# Patient Record
Sex: MALE | Race: WHITE | NOT HISPANIC OR LATINO
[De-identification: names, ages, dates, MRNs, and addresses within clinical notes are randomized per-mention and may not be internally consistent; named-entity substitution may affect disease eponyms.]

---

## 2017-02-08 ENCOUNTER — CHART COPY (OUTPATIENT)
Age: 72
End: 2017-02-08

## 2019-11-26 ENCOUNTER — APPOINTMENT (OUTPATIENT)
Dept: GASTROENTEROLOGY | Facility: CLINIC | Age: 74
End: 2019-11-26
Payer: MEDICARE

## 2019-11-26 VITALS
WEIGHT: 190 LBS | DIASTOLIC BLOOD PRESSURE: 70 MMHG | TEMPERATURE: 97.4 F | HEART RATE: 57 BPM | BODY MASS INDEX: 26.6 KG/M2 | SYSTOLIC BLOOD PRESSURE: 120 MMHG | OXYGEN SATURATION: 98 % | HEIGHT: 71 IN

## 2019-11-26 DIAGNOSIS — Z82.49 FAMILY HISTORY OF ISCHEMIC HEART DISEASE AND OTHER DISEASES OF THE CIRCULATORY SYSTEM: ICD-10-CM

## 2019-11-26 DIAGNOSIS — N18.9 CHRONIC KIDNEY DISEASE, UNSPECIFIED: ICD-10-CM

## 2019-11-26 DIAGNOSIS — Z78.9 OTHER SPECIFIED HEALTH STATUS: ICD-10-CM

## 2019-11-26 DIAGNOSIS — K63.5 POLYP OF COLON: ICD-10-CM

## 2019-11-26 DIAGNOSIS — Z12.11 ENCOUNTER FOR SCREENING FOR MALIGNANT NEOPLASM OF COLON: ICD-10-CM

## 2019-11-26 DIAGNOSIS — I25.10 ATHEROSCLEROTIC HEART DISEASE OF NATIVE CORONARY ARTERY W/OUT ANGINA PECTORIS: ICD-10-CM

## 2019-11-26 DIAGNOSIS — K86.2 CYST OF PANCREAS: ICD-10-CM

## 2019-11-26 DIAGNOSIS — Z85.79 PERSONAL HISTORY OF OTHER MALIGNANT NEOPLASMS OF LYMPHOID, HEMATOPOIETIC AND RELATED TISSUES: ICD-10-CM

## 2019-11-26 DIAGNOSIS — C61 MALIGNANT NEOPLASM OF PROSTATE: ICD-10-CM

## 2019-11-26 DIAGNOSIS — Z95.5 PRESENCE OF CORONARY ANGIOPLASTY IMPLANT AND GRAFT: ICD-10-CM

## 2019-11-26 PROCEDURE — 99204 OFFICE O/P NEW MOD 45 MIN: CPT

## 2019-11-26 RX ORDER — NIACIN 500 MG/1
500 TABLET, FILM COATED, EXTENDED RELEASE ORAL
Qty: 90 | Refills: 0 | Status: ACTIVE | COMMUNITY
Start: 2019-08-06

## 2019-11-26 RX ORDER — ASPIRIN 81 MG
81 TABLET, DELAYED RELEASE (ENTERIC COATED) ORAL
Refills: 0 | Status: ACTIVE | COMMUNITY

## 2019-11-26 RX ORDER — FOLIC ACID 20 MG
CAPSULE ORAL
Refills: 0 | Status: ACTIVE | COMMUNITY

## 2019-11-26 RX ORDER — METOPROLOL SUCCINATE 25 MG/1
25 TABLET, EXTENDED RELEASE ORAL
Qty: 135 | Refills: 0 | Status: ACTIVE | COMMUNITY
Start: 2019-08-06

## 2019-11-26 NOTE — PHYSICAL EXAM
[General Appearance - In No Acute Distress] : in no acute distress [General Appearance - Alert] : alert [Sclera] : the sclera and conjunctiva were normal [PERRL With Normal Accommodation] : pupils were equal in size, round, and reactive to light [Outer Ear] : the ears and nose were normal in appearance [Extraocular Movements] : extraocular movements were intact [Oropharynx] : the oropharynx was normal [Neck Appearance] : the appearance of the neck was normal [Thyroid Diffuse Enlargement] : the thyroid was not enlarged [Neck Cervical Mass (___cm)] : no neck mass was observed [Jugular Venous Distention Increased] : there was no jugular-venous distention [Thyroid Nodule] : there were no palpable thyroid nodules [Heart Rate And Rhythm] : heart rate was normal and rhythm regular [Auscultation Breath Sounds / Voice Sounds] : lungs were clear to auscultation bilaterally [Heart Sounds] : normal S1 and S2 [Heart Sounds Gallop] : no gallops [Heart Sounds Pericardial Friction Rub] : no pericardial rub [Murmurs] : no murmurs [Bowel Sounds] : normal bowel sounds [Abdomen Soft] : soft [Abdomen Tenderness] : non-tender [] : no hepato-splenomegaly [Cervical Lymph Nodes Enlarged Posterior Bilaterally] : posterior cervical [Abdomen Mass (___ Cm)] : no abdominal mass palpated [Supraclavicular Lymph Nodes Enlarged Bilaterally] : supraclavicular [Cervical Lymph Nodes Enlarged Anterior Bilaterally] : anterior cervical [No CVA Tenderness] : no ~M costovertebral angle tenderness [No Spinal Tenderness] : no spinal tenderness [Nail Clubbing] : no clubbing  or cyanosis of the fingernails [Abnormal Walk] : normal gait [Motor Tone] : muscle strength and tone were normal [Musculoskeletal - Swelling] : no joint swelling seen [Sensation] : the sensory exam was normal to light touch and pinprick [Deep Tendon Reflexes (DTR)] : deep tendon reflexes were 2+ and symmetric [Oriented To Time, Place, And Person] : oriented to person, place, and time [No Focal Deficits] : no focal deficits [Impaired Insight] : insight and judgment were intact [Affect] : the affect was normal

## 2019-11-26 NOTE — HISTORY OF PRESENT ILLNESS
[de-identified] : 74 year old man presents for a screening colonoscopy. his last colonoscopy was over 5 years ago and he had some polyps removed. He denies rectal bleeding , melena or hematemesis. He is being treated for multiple myeloma. He has a history of CAD and is s/p CABG and stent placement.

## 2020-02-19 ENCOUNTER — APPOINTMENT (OUTPATIENT)
Dept: GASTROENTEROLOGY | Facility: AMBULATORY MEDICAL SERVICES | Age: 75
End: 2020-02-19

## 2020-02-26 ENCOUNTER — APPOINTMENT (OUTPATIENT)
Dept: GASTROENTEROLOGY | Facility: AMBULATORY MEDICAL SERVICES | Age: 75
End: 2020-02-26

## 2020-06-07 ENCOUNTER — APPOINTMENT (OUTPATIENT)
Dept: DISASTER EMERGENCY | Facility: CLINIC | Age: 75
End: 2020-06-07

## 2020-06-07 DIAGNOSIS — Z01.818 ENCOUNTER FOR OTHER PREPROCEDURAL EXAMINATION: ICD-10-CM

## 2020-06-07 LAB — SARS-COV-2 N GENE NPH QL NAA+PROBE: NOT DETECTED

## 2020-06-08 ENCOUNTER — TRANSCRIPTION ENCOUNTER (OUTPATIENT)
Age: 75
End: 2020-06-08

## 2020-06-10 ENCOUNTER — APPOINTMENT (OUTPATIENT)
Dept: GASTROENTEROLOGY | Facility: AMBULATORY MEDICAL SERVICES | Age: 75
End: 2020-06-10
Payer: MEDICARE

## 2020-06-10 PROCEDURE — 45380 COLONOSCOPY AND BIOPSY: CPT

## 2020-08-10 ENCOUNTER — APPOINTMENT (OUTPATIENT)
Dept: OTOLARYNGOLOGY | Facility: CLINIC | Age: 75
End: 2020-08-10
Payer: MEDICARE

## 2020-08-10 VITALS
WEIGHT: 195 LBS | TEMPERATURE: 97.9 F | DIASTOLIC BLOOD PRESSURE: 72 MMHG | HEART RATE: 58 BPM | BODY MASS INDEX: 27.3 KG/M2 | SYSTOLIC BLOOD PRESSURE: 117 MMHG | HEIGHT: 71 IN

## 2020-08-10 DIAGNOSIS — H93.13 TINNITUS, BILATERAL: ICD-10-CM

## 2020-08-10 DIAGNOSIS — H90.3 SENSORINEURAL HEARING LOSS, BILATERAL: ICD-10-CM

## 2020-08-10 PROCEDURE — 92567 TYMPANOMETRY: CPT

## 2020-08-10 PROCEDURE — 99204 OFFICE O/P NEW MOD 45 MIN: CPT | Mod: 25

## 2020-08-10 PROCEDURE — 92557 COMPREHENSIVE HEARING TEST: CPT

## 2020-08-10 NOTE — ASSESSMENT
[FreeTextEntry1] : Bilateral Tinnitus w/ Assoc mild to moderate SNHL\par Rec Acupuncture andf avoid l;oud noises\par \par \par -Hearing Test performed to evaluate the extent of hearing loss and to explain pt's symptoms-SNHL\par bilateral sensorineural hearing loss-cleared for hearing aids\par \par f/u 6 months and prn

## 2020-08-10 NOTE — HISTORY OF PRESENT ILLNESS
[Tinnitus] : tinnitus [No] : patient does not have a  history of radiation therapy [Hearing Loss] : hearing loss [Presbycusis] : presbycusis [de-identified] : 76 yo male\par Patient complains of bilateral tinnitus x 1 month. Describes as a constant humming. Has a mild hearing loss. tinnitus does not effect hearing. Denies recent loud noise exposure, URI. \par Pt has no ear pain, ear drainage, vertigo, nasal congestion, nasal discharge, epistaxis, sinus infections, facial pain, facial pressure, throat pain, dysphagia or fevers\par \par  [Anxiety] : no anxiety [Dizziness] : no dizziness [Recurrent Otitis Media] : no recurrent otitis media [Headache] : no headache [Otitis Media with Effusion] : no otitis media with effusion [Congenital Ear Malformation] : no congenital ear malformation [Meniere Disease] : no Meniere disease [Otosclerosis] : no otosclerosis [Eustachian Tube Dysfunction] : no eustachian tube dysfunction [Perilymphatic Fistula] : no perilymphatic fistula [Hypertension] : no hypertension [Smoking] : no smoking [Loud Noise Exposure] : no history of loud noise exposure [Stroke] : no stroke [Early Onset Hearing Loss] : no early onset hearing loss [Facial Pressure] : no facial pressure [Facial Pain] : no facial pain [Nasal Congestion] : no nasal congestion [Diplopia] : no diplopia [Allergic Rhinitis] : no allergic rhinitis [Ear Fullness] : no ear fullness [Maxillary Tooth Infection] : no maxillary tooth infection [Environmental Allergies] : no environmental allergies [Septal Deviation] : no septal deviation [Adenoidectomy] : no adenoidectomy [Environmental Allergens] : no environmental allergens [Seasonal Allergies] : no seasonal allergies [Nasal FB Removal] : no nasal foreign body removal [Allergies] : no allergies [Asthma] : no asthma [Neck Mass] : no neck mass [Neck Pain] : no neck pain [Chills] : no chills [Cough] : no cough [Cold Intolerance] : no cold intolerance [Fatigue] : no fatigue [Hyperthyroidism] : no hyperthyroidism [Heat Intolerance] : no heat intolerance [Sialadenitis] : no sialadenitis [Hodgkin Disease] : no hodgkin disease [Alcohol Use] : no alcohol use [Non-Hodgkin Lymphoma] : no non-hodgkin lymphoma [Tobacco Use] : no tobacco use [Graves Disease] : no graves disease [Thyroid Cancer] : no thyroid cancer

## 2020-08-10 NOTE — DATA REVIEWED
[de-identified] : bilat SNHL\par Hearing Test performed to evaluate the extent of hearing loss and  to explain pt's symptoms\par

## 2020-12-21 PROBLEM — Z12.11 ENCOUNTER FOR SCREENING COLONOSCOPY: Status: RESOLVED | Noted: 2019-11-26 | Resolved: 2020-12-21

## 2022-01-24 ENCOUNTER — INPATIENT (INPATIENT)
Facility: HOSPITAL | Age: 77
LOS: 1 days | Discharge: HOME | DRG: 69 | End: 2022-01-26
Attending: FAMILY MEDICINE | Admitting: FAMILY MEDICINE
Payer: MEDICARE

## 2022-01-24 VITALS
HEIGHT: 71.5 IN | RESPIRATION RATE: 18 BRPM | TEMPERATURE: 97 F | SYSTOLIC BLOOD PRESSURE: 142 MMHG | WEIGHT: 192.02 LBS | HEART RATE: 63 BPM | DIASTOLIC BLOOD PRESSURE: 77 MMHG | OXYGEN SATURATION: 100 %

## 2022-01-24 DIAGNOSIS — R42 DIZZINESS AND GIDDINESS: ICD-10-CM

## 2022-01-24 DIAGNOSIS — Z29.9 ENCOUNTER FOR PROPHYLACTIC MEASURES, UNSPECIFIED: ICD-10-CM

## 2022-01-24 LAB
ALBUMIN SERPL ELPH-MCNC: 3.6 G/DL — SIGNIFICANT CHANGE UP (ref 3.3–5)
ALP SERPL-CCNC: 59 U/L — SIGNIFICANT CHANGE UP (ref 40–120)
ALT FLD-CCNC: 23 U/L — SIGNIFICANT CHANGE UP (ref 12–78)
ANION GAP SERPL CALC-SCNC: 6 MMOL/L — SIGNIFICANT CHANGE UP (ref 5–17)
APTT BLD: 30.9 SEC — SIGNIFICANT CHANGE UP (ref 27.5–35.5)
AST SERPL-CCNC: 18 U/L — SIGNIFICANT CHANGE UP (ref 15–37)
BASOPHILS # BLD AUTO: 0.03 K/UL — SIGNIFICANT CHANGE UP (ref 0–0.2)
BASOPHILS NFR BLD AUTO: 0.5 % — SIGNIFICANT CHANGE UP (ref 0–2)
BILIRUB SERPL-MCNC: 1 MG/DL — SIGNIFICANT CHANGE UP (ref 0.2–1.2)
BUN SERPL-MCNC: 24 MG/DL — HIGH (ref 7–23)
CALCIUM SERPL-MCNC: 8.8 MG/DL — SIGNIFICANT CHANGE UP (ref 8.5–10.1)
CHLORIDE SERPL-SCNC: 107 MMOL/L — SIGNIFICANT CHANGE UP (ref 96–108)
CK MB BLD-MCNC: <1.2 % — SIGNIFICANT CHANGE UP (ref 0–3.5)
CK MB CFR SERPL CALC: <1 NG/ML — SIGNIFICANT CHANGE UP (ref 0–3.6)
CK SERPL-CCNC: 82 U/L — SIGNIFICANT CHANGE UP (ref 26–308)
CO2 SERPL-SCNC: 26 MMOL/L — SIGNIFICANT CHANGE UP (ref 22–31)
CREAT SERPL-MCNC: 1.6 MG/DL — HIGH (ref 0.5–1.3)
EOSINOPHIL # BLD AUTO: 0.16 K/UL — SIGNIFICANT CHANGE UP (ref 0–0.5)
EOSINOPHIL NFR BLD AUTO: 2.7 % — SIGNIFICANT CHANGE UP (ref 0–6)
GLUCOSE SERPL-MCNC: 147 MG/DL — HIGH (ref 70–99)
HCT VFR BLD CALC: 44.3 % — SIGNIFICANT CHANGE UP (ref 39–50)
HGB BLD-MCNC: 15.4 G/DL — SIGNIFICANT CHANGE UP (ref 13–17)
IMM GRANULOCYTES NFR BLD AUTO: 0.2 % — SIGNIFICANT CHANGE UP (ref 0–1.5)
INR BLD: 1.08 RATIO — SIGNIFICANT CHANGE UP (ref 0.88–1.16)
LYMPHOCYTES # BLD AUTO: 0.99 K/UL — LOW (ref 1–3.3)
LYMPHOCYTES # BLD AUTO: 17 % — SIGNIFICANT CHANGE UP (ref 13–44)
MCHC RBC-ENTMCNC: 32.2 PG — SIGNIFICANT CHANGE UP (ref 27–34)
MCHC RBC-ENTMCNC: 34.8 GM/DL — SIGNIFICANT CHANGE UP (ref 32–36)
MCV RBC AUTO: 92.7 FL — SIGNIFICANT CHANGE UP (ref 80–100)
MONOCYTES # BLD AUTO: 0.6 K/UL — SIGNIFICANT CHANGE UP (ref 0–0.9)
MONOCYTES NFR BLD AUTO: 10.3 % — SIGNIFICANT CHANGE UP (ref 2–14)
NEUTROPHILS # BLD AUTO: 4.04 K/UL — SIGNIFICANT CHANGE UP (ref 1.8–7.4)
NEUTROPHILS NFR BLD AUTO: 69.3 % — SIGNIFICANT CHANGE UP (ref 43–77)
NRBC # BLD: 0 /100 WBCS — SIGNIFICANT CHANGE UP (ref 0–0)
PLAT MORPH BLD: NORMAL — SIGNIFICANT CHANGE UP
PLATELET # BLD AUTO: 127 K/UL — LOW (ref 150–400)
POTASSIUM SERPL-MCNC: 3.7 MMOL/L — SIGNIFICANT CHANGE UP (ref 3.5–5.3)
POTASSIUM SERPL-SCNC: 3.7 MMOL/L — SIGNIFICANT CHANGE UP (ref 3.5–5.3)
PROT SERPL-MCNC: 7.3 G/DL — SIGNIFICANT CHANGE UP (ref 6–8.3)
PROTHROM AB SERPL-ACNC: 12.6 SEC — SIGNIFICANT CHANGE UP (ref 10.6–13.6)
RBC # BLD: 4.78 M/UL — SIGNIFICANT CHANGE UP (ref 4.2–5.8)
RBC # FLD: 13.7 % — SIGNIFICANT CHANGE UP (ref 10.3–14.5)
RBC BLD AUTO: NORMAL — SIGNIFICANT CHANGE UP
SARS-COV-2 RNA SPEC QL NAA+PROBE: SIGNIFICANT CHANGE UP
SODIUM SERPL-SCNC: 139 MMOL/L — SIGNIFICANT CHANGE UP (ref 135–145)
TROPONIN I, HIGH SENSITIVITY RESULT: 8.9 NG/L — SIGNIFICANT CHANGE UP
WBC # BLD: 5.83 K/UL — SIGNIFICANT CHANGE UP (ref 3.8–10.5)
WBC # FLD AUTO: 5.83 K/UL — SIGNIFICANT CHANGE UP (ref 3.8–10.5)

## 2022-01-24 PROCEDURE — 70450 CT HEAD/BRAIN W/O DYE: CPT | Mod: 26,MA,59

## 2022-01-24 PROCEDURE — 70496 CT ANGIOGRAPHY HEAD: CPT | Mod: 26,MA

## 2022-01-24 PROCEDURE — 99285 EMERGENCY DEPT VISIT HI MDM: CPT

## 2022-01-24 PROCEDURE — 93010 ELECTROCARDIOGRAM REPORT: CPT

## 2022-01-24 PROCEDURE — 70498 CT ANGIOGRAPHY NECK: CPT | Mod: 26,MA

## 2022-01-24 PROCEDURE — 0042T: CPT

## 2022-01-24 PROCEDURE — 71045 X-RAY EXAM CHEST 1 VIEW: CPT | Mod: 26

## 2022-01-24 RX ORDER — ROSUVASTATIN CALCIUM 5 MG/1
1 TABLET ORAL
Qty: 0 | Refills: 0 | DISCHARGE

## 2022-01-24 RX ORDER — ATORVASTATIN CALCIUM 80 MG/1
40 TABLET, FILM COATED ORAL AT BEDTIME
Refills: 0 | Status: DISCONTINUED | OUTPATIENT
Start: 2022-01-24 | End: 2022-01-26

## 2022-01-24 RX ORDER — METOPROLOL TARTRATE 50 MG
25 TABLET ORAL DAILY
Refills: 0 | Status: DISCONTINUED | OUTPATIENT
Start: 2022-01-24 | End: 2022-01-26

## 2022-01-24 RX ORDER — CHOLECALCIFEROL (VITAMIN D3) 125 MCG
1 CAPSULE ORAL
Qty: 0 | Refills: 0 | DISCHARGE

## 2022-01-24 RX ORDER — OMEGA-3 ACID ETHYL ESTERS 1 G
2 CAPSULE ORAL
Qty: 0 | Refills: 0 | DISCHARGE

## 2022-01-24 RX ORDER — METOPROLOL TARTRATE 50 MG
1 TABLET ORAL
Qty: 0 | Refills: 0 | DISCHARGE

## 2022-01-24 RX ORDER — FOLIC ACID 0.8 MG
1 TABLET ORAL
Qty: 0 | Refills: 0 | DISCHARGE

## 2022-01-24 RX ORDER — MECLIZINE HCL 12.5 MG
12.5 TABLET ORAL THREE TIMES A DAY
Refills: 0 | Status: DISCONTINUED | OUTPATIENT
Start: 2022-01-24 | End: 2022-01-26

## 2022-01-24 RX ORDER — FOLIC ACID 0.8 MG
1 TABLET ORAL DAILY
Refills: 0 | Status: DISCONTINUED | OUTPATIENT
Start: 2022-01-24 | End: 2022-01-26

## 2022-01-24 RX ORDER — ASPIRIN/CALCIUM CARB/MAGNESIUM 324 MG
325 TABLET ORAL DAILY
Refills: 0 | Status: DISCONTINUED | OUTPATIENT
Start: 2022-01-24 | End: 2022-01-26

## 2022-01-24 RX ORDER — NIACIN 50 MG
0 TABLET ORAL
Qty: 0 | Refills: 0 | DISCHARGE

## 2022-01-24 RX ORDER — NIACIN 50 MG
500 TABLET ORAL AT BEDTIME
Refills: 0 | Status: DISCONTINUED | OUTPATIENT
Start: 2022-01-24 | End: 2022-01-26

## 2022-01-24 RX ORDER — AMLODIPINE BESYLATE 2.5 MG/1
2.5 TABLET ORAL DAILY
Refills: 0 | Status: DISCONTINUED | OUTPATIENT
Start: 2022-01-24 | End: 2022-01-25

## 2022-01-24 RX ORDER — CHOLECALCIFEROL (VITAMIN D3) 125 MCG
1000 CAPSULE ORAL DAILY
Refills: 0 | Status: DISCONTINUED | OUTPATIENT
Start: 2022-01-24 | End: 2022-01-26

## 2022-01-24 RX ORDER — NIACIN 50 MG
1 TABLET ORAL
Qty: 0 | Refills: 0 | DISCHARGE

## 2022-01-24 RX ORDER — OMEGA-3 ACID ETHYL ESTERS 1 G
2 CAPSULE ORAL
Refills: 0 | Status: DISCONTINUED | OUTPATIENT
Start: 2022-01-24 | End: 2022-01-26

## 2022-01-24 RX ORDER — SODIUM CHLORIDE 9 MG/ML
1000 INJECTION INTRAMUSCULAR; INTRAVENOUS; SUBCUTANEOUS ONCE
Refills: 0 | Status: COMPLETED | OUTPATIENT
Start: 2022-01-24 | End: 2022-01-24

## 2022-01-24 RX ORDER — MECLIZINE HCL 12.5 MG
25 TABLET ORAL ONCE
Refills: 0 | Status: COMPLETED | OUTPATIENT
Start: 2022-01-24 | End: 2022-01-24

## 2022-01-24 RX ADMIN — Medication 25 MILLIGRAM(S): at 14:45

## 2022-01-24 RX ADMIN — SODIUM CHLORIDE 1000 MILLILITER(S): 9 INJECTION INTRAMUSCULAR; INTRAVENOUS; SUBCUTANEOUS at 11:26

## 2022-01-24 NOTE — ED PROVIDER NOTE - EYES, MLM
Clear bilaterally, pupils equal, round and reactive to light. eyes are bloodshot no cell flare hyphema visible no nystagmus

## 2022-01-24 NOTE — SPEECH LANGUAGE PATHOLOGY EVALUATION - SLP GENERAL OBSERVATIONS
Pt received upright in bed, A&Ox4, on room air. Pt was agreeable to assessment. Pt denied pain pre and post assessment.

## 2022-01-24 NOTE — SPEECH LANGUAGE PATHOLOGY EVALUATION - SLP DIAGNOSIS
Informal measures were administered to assess speech/language/cognitive function. Receptively, pt accurately responded to yes/no questions, WH- questions, and open-ended questions. Pt accurately followed simple commands and completed object ID of common objects and body parts with L/R discrimination. Pt engaged in objection manipulation tasks w/o difficulty. Expressively, pt accurately engaged in confrontation naming, responsive naming, and automatic speech tasks. Pt participated in discourse at the conversational level. Pt's verbal output was fluent and absent of anomias and paraphasias. Pt's speech production was intelligible with precise articulation and adequate rate of production. Pt's vocal quality/intensity was WFL. Cognitively, pt was A&Ox4. Pt was able to recall recent and long-term events. Pt's overall speech/language/cognitive function is WFL and at baseline, no further skilled speech intervention is warranted at this time. If there is a change in status, please reconsult

## 2022-01-24 NOTE — ED PROVIDER NOTE - NSICDXPASTMEDICALHX_GEN_ALL_CORE_FT
PAST MEDICAL HISTORY:  History of myocardial infarction 6/2004    Hyperlipidemia     Kidney neoplasm     Prostate cancer

## 2022-01-24 NOTE — SWALLOW BEDSIDE ASSESSMENT ADULT - SWALLOW EVAL: DIAGNOSIS
Pt self-administered PO trials of thin liquids, puree, and regular solids. Pt p/w a functional oral phase marked by adequate retrieval and containment, timely mastication/manipulation and transfer, and adequate clearance post swallow. Pharyngeal phase marked by suspected timely swallow onset, +laryngeal elevation to palpation, and no overt s/sx of aspiration. Recommend pt resume current diet order of regular solids with thin liquids +aspiration precautions. Pt self-administered PO trials of thin liquids, puree, and regular solids. Pt p/w a functional oral phase marked by adequate retrieval and containment, timely mastication/manipulation and transfer, and adequate clearance post swallow. Pharyngeal phase marked by suspected timely swallow onset, +laryngeal elevation to palpation, and no overt s/sx of aspiration. Recommend pt resume current diet order of regular solids with thin liquids +aspiration precautions. Left message with Dr. Strong.

## 2022-01-24 NOTE — ED PROVIDER NOTE - NSICDXPASTSURGICALHX_GEN_ALL_CORE_FT
PAST SURGICAL HISTORY:  H/O prostate biopsy 2000    H/O radical prostatectomy 4/2000    History of tonsillectomy 1953    Stented coronary artery LAD-6/2004

## 2022-01-24 NOTE — SWALLOW BEDSIDE ASSESSMENT ADULT - SWALLOW EVAL: RECOMMENDED DIET
regular solids with thin liquids +aspiration regular solids with thin liquids +aspiration precautions

## 2022-01-24 NOTE — SWALLOW BEDSIDE ASSESSMENT ADULT - COMMENTS
Pt received upright in bed, A&Ox4, on room air. Pt was agreeable to assessment. Pt followed simple commands. Pt's vocal quality/intensity and speech production was WFL. Pt denied h/o dysphagia. Pt denied pain pre and post assessment.    CT head 1/24: "Old lacunar infarct involving the anterior left corona radiata region is again seen and unchanged. There is no evidence acute hemorrhage mass mass effect seen"  CXR 1/24 results pending. Pt's WBC is WFL.

## 2022-01-24 NOTE — SPEECH LANGUAGE PATHOLOGY EVALUATION - SLP PERTINENT HISTORY OF CURRENT PROBLEM
Per charting, "75 yo male whose pmd is dr Mack, hx of htn cad hld sp ptca x1 for mi never smoker not a drinker was [place] [facility] until this am when he awoke with severe dizzyness on head movement as he turned in bed at 7:30.  His LKW (when he went to sleep) was  11:30 pm last telma. he has no hx of same the sx are better now but he called 911 for evaluation.  [facility], accucheck, ekg and stroke score reported normal no trauma no fever no chills no cp no sob no other co."

## 2022-01-24 NOTE — H&P ADULT - NSHPLABSRESULTS_GEN_ALL_CORE
01-24    139  |  107  |  24<H>  ----------------------------<  147<H>  3.7   |  26  |  1.60<H>    Ca    8.8      24 Jan 2022 09:37    TPro  7.3  /  Alb  3.6  /  TBili  1.0  /  DBili  x   /  AST  18  /  ALT  23  /  AlkPhos  59  01-24                            15.4   5.83  )-----------( 127      ( 24 Jan 2022 09:37 )             44.3       CARDIAC MARKERS ( 24 Jan 2022 09:37 )  x     / x     / 82 U/L / x     / <1.0 ng/mL        LIVER FUNCTIONS - ( 24 Jan 2022 09:37 )  Alb: 3.6 g/dL / Pro: 7.3 g/dL / ALK PHOS: 59 U/L / ALT: 23 U/L / AST: 18 U/L / GGT: x             PT/INR - ( 24 Jan 2022 09:37 )   PT: 12.6 sec;   INR: 1.08 ratio         PTT - ( 24 Jan 2022 09:37 )  PTT:30.9 sec

## 2022-01-24 NOTE — SPEECH LANGUAGE PATHOLOGY EVALUATION - COMMENTS
Pt denied h/o speech/language/cognitive difficulty PTA. Pt reported he lives at home with his wife and is independent with ADLs.    CT head 1/24: "Old lacunar infarct involving the anterior left corona radiata region is again seen and unchanged. There is no evidence acute hemorrhage mass mass effect seen"

## 2022-01-24 NOTE — H&P ADULT - NSHPPHYSICALEXAM_GEN_ALL_CORE
· CONSTITUTIONAL: Well appearing, awake, alert, oriented to person, place, time/situation and in no apparent distress.  · ENMT: Airway patent, Nasal mucosa clear. Mouth with normal mucosa. Throat has no vesicles, no oropharyngeal exudates and uvula is midline.  · EYES: Clear bilaterally, pupils equal, round and reactive to light. eyes are bloodshot no cell flare hyphema visible no nystagmus  · CARDIAC: Normal rate, regular rhythm.  Heart sounds S1, S2.  No murmurs, rubs or gallops.  · RESPIRATORY: Breath sounds clear and equal bilaterally.  · GASTROINTESTINAL: Abdomen soft, non-tender, no guarding no rebound no cvat  · MUSCULOSKELETAL: Spine appears normal, range of motion is not limited, no muscle or joint tenderness  · NEUROLOGICAL: Alert and oriented, no focal deficits, no motor or sensory deficits.  · SKIN: Skin normal color for race, warm, dry and intact. No evidence of rash.  · PSYCHIATRIC: Alert and oriented to person, place, time/situation. normal mood and affect. no apparent risk to self or others.  · HEME LYMPH: No adenopathy or splenomegaly. No cervical or inguinal lymphadenopathy.

## 2022-01-24 NOTE — SWALLOW BEDSIDE ASSESSMENT ADULT - SLP PERTINENT HISTORY OF CURRENT PROBLEM
Per charting, "77 yo male whose pmd is dr Mack, hx of htn cad hld sp ptca x1 for mi never smoker not a drinker was [place] [facility] until this am when he awoke with severe dizzyness on head movement as he turned in bed at 7:30.  His LKW (when he went to sleep) was  11:30 pm last telma. he has no hx of same the sx are better now but he called 911 for evaluation.  [facility], accucheck, ekg and stroke score reported normal no trauma no fever no chills no cp no sob no other co."

## 2022-01-24 NOTE — ED PROVIDER NOTE - CLINICAL SUMMARY MEDICAL DECISION MAKING FREE TEXT BOX
77 yo male who has hx of acs with stent and mi  allergic to plavix and tpa, who has sudden onset of dizzy and vertigo sx not eligible for tpa due to delay in presentation as this was a "wake up stroke like sx" ro stroke ro bleed ro  mass  ct cta perfusion  neurology   cardiology   admit to tele for mri per neurology

## 2022-01-24 NOTE — ED PROVIDER NOTE - NS ED MD DISPO SPECIAL CONSIDERATION1
Problem: Pressure Injury - Risk of  Goal: *Prevention of pressure injury  Document Mitchel Scale and appropriate interventions in the flowsheet.    Outcome: Progressing Towards Goal  Pressure Injury Interventions:  Sensory Interventions: Assess need for specialty bed, Keep linens dry and wrinkle-free, Float heels         Activity Interventions: Increase time out of bed, Pressure redistribution bed/mattress(bed type)    Mobility Interventions: Assess need for specialty bed, Float heels    Nutrition Interventions: Offer support with meals,snacks and hydration    Friction and Shear Interventions: HOB 30 degrees or less, Lift sheet Low Suspicion of COVID-19

## 2022-01-24 NOTE — ED PROVIDER NOTE - PROGRESS NOTE DETAILS
neurology paged for consult radiology called: pt has old stroke left corona radiata no acute bleed mass shift neurology paged for consult  Dr Rivas paged for backup call 9:59 neurology paged for consult  Dr Rivas paged for backup call 9:59  dr rivas aware-at 10:05 will see pt called from ct tech,- pt has hx of MM and mild sergei will go ahead with cta ctp pt has asked we speak with son if son calls, as his son is an er physician pt seen and eval by neurology dr Rivas able to ambulate but still symptomatic will need admission for mri testing, dr Strong paged for the admission to tele accepts cell [phone] pt's son DR. Ordoñez, results labs plans  dw him

## 2022-01-24 NOTE — PHYSICAL THERAPY INITIAL EVALUATION ADULT - DIAGNOSIS, PT EVAL
R/O CVA/TIA; R/O Vertigo R/O Vertigo; R/O CVA/TIA (No significant findings on 1/24 CT, except: Old lacunar infarct & HYPOPLASTIC LEFT VERTEBRAL ARTERY)

## 2022-01-24 NOTE — H&P ADULT - HISTORY OF PRESENT ILLNESS
pt is a 75 yo male whose pmd is dr Mack, hx of htn cad hld sp ptca x1 for mi never smoker not a drinker was [place] [facility] until this am when he awoke with severe dizziness on head movement as he turned in bed at 7:30.  His LKW (when he went to sleep) was  11:30 pm last telma. he has no hx of same the sx are better now but he called 911 for evaluation.  [facility], accucheck, ekg and stroke score reported normal  no trauma no fever no chills no cp no sob no other co.  In ER , patient was seen by neuro  patient is being admitted for further work up and treatment.

## 2022-01-24 NOTE — ED PROVIDER NOTE - OBJECTIVE STATEMENT
pt is a 77 yo male whose pmd is dr Mack, hx of htn cad hld sp ptca x1 for mi never smoker not a drinker was in [facility] until this am when he awoke with severe dizzyness on head movement as he turned in bed at 7:30.  His LKW (when he went to sleep) was  11:30 pm last telma. he has no hx of same the sx are better now but he called 911 for evaluation.  [facility], accucheck, ekg and stroke score reported normal  no trauma no fever no chills no cp no sob no other co. pt is a 77 yo male whose pmd is dr Mack, hx of htn cad hld sp ptca x1 for mi never smoker not a drinker was in [facility] until this am when he awoke with severe dizziness on head movement as he turned in bed at 7:30.  His LKW (when he went to sleep) was  11:30 pm last telma. he has no hx of same the sx are better now but he called 911 for evaluation.  [facility], accucheck, ekg and stroke score reported normal  no trauma no fever no chills no cp no sob no other co.

## 2022-01-24 NOTE — ED PROVIDER NOTE - CHPI ED SYMPTOMS NEG
no blurred vision/no confusion/no fever/no loss of consciousness/no nausea/no numbness/no vomiting/no change in level of consciousness

## 2022-01-24 NOTE — H&P ADULT - NSHPREVIEWOFSYSTEMS_GEN_ALL_CORE
· Neurological [+]: DIFFICULTY WALKING / IMBALANCE, dizzy  · ROS STATEMENT: all other ROS negative except as per HPI

## 2022-01-25 ENCOUNTER — TRANSCRIPTION ENCOUNTER (OUTPATIENT)
Age: 77
End: 2022-01-25

## 2022-01-25 LAB
ANION GAP SERPL CALC-SCNC: 6 MMOL/L — SIGNIFICANT CHANGE UP (ref 5–17)
BUN SERPL-MCNC: 19 MG/DL — SIGNIFICANT CHANGE UP (ref 7–23)
CALCIUM SERPL-MCNC: 8.8 MG/DL — SIGNIFICANT CHANGE UP (ref 8.5–10.1)
CHLORIDE SERPL-SCNC: 107 MMOL/L — SIGNIFICANT CHANGE UP (ref 96–108)
CO2 SERPL-SCNC: 26 MMOL/L — SIGNIFICANT CHANGE UP (ref 22–31)
CREAT SERPL-MCNC: 1.5 MG/DL — HIGH (ref 0.5–1.3)
GLUCOSE SERPL-MCNC: 147 MG/DL — HIGH (ref 70–99)
HCT VFR BLD CALC: 42.9 % — SIGNIFICANT CHANGE UP (ref 39–50)
HCV AB S/CO SERPL IA: 0.16 S/CO — SIGNIFICANT CHANGE UP (ref 0–0.99)
HCV AB SERPL-IMP: SIGNIFICANT CHANGE UP
HGB BLD-MCNC: 15.2 G/DL — SIGNIFICANT CHANGE UP (ref 13–17)
MCHC RBC-ENTMCNC: 32.7 PG — SIGNIFICANT CHANGE UP (ref 27–34)
MCHC RBC-ENTMCNC: 35.4 GM/DL — SIGNIFICANT CHANGE UP (ref 32–36)
MCV RBC AUTO: 92.3 FL — SIGNIFICANT CHANGE UP (ref 80–100)
NRBC # BLD: 0 /100 WBCS — SIGNIFICANT CHANGE UP (ref 0–0)
PLATELET # BLD AUTO: 118 K/UL — LOW (ref 150–400)
POTASSIUM SERPL-MCNC: 4 MMOL/L — SIGNIFICANT CHANGE UP (ref 3.5–5.3)
POTASSIUM SERPL-SCNC: 4 MMOL/L — SIGNIFICANT CHANGE UP (ref 3.5–5.3)
RBC # BLD: 4.65 M/UL — SIGNIFICANT CHANGE UP (ref 4.2–5.8)
RBC # FLD: 14 % — SIGNIFICANT CHANGE UP (ref 10.3–14.5)
SODIUM SERPL-SCNC: 139 MMOL/L — SIGNIFICANT CHANGE UP (ref 135–145)
WBC # BLD: 5.54 K/UL — SIGNIFICANT CHANGE UP (ref 3.8–10.5)
WBC # FLD AUTO: 5.54 K/UL — SIGNIFICANT CHANGE UP (ref 3.8–10.5)

## 2022-01-25 PROCEDURE — 70551 MRI BRAIN STEM W/O DYE: CPT | Mod: 26,59

## 2022-01-25 PROCEDURE — 70551 MRI BRAIN STEM W/O DYE: CPT | Mod: 26

## 2022-01-25 PROCEDURE — 70546 MR ANGIOGRAPH HEAD W/O&W/DYE: CPT | Mod: 26

## 2022-01-25 RX ORDER — HEPARIN SODIUM 5000 [USP'U]/ML
5000 INJECTION INTRAVENOUS; SUBCUTANEOUS EVERY 12 HOURS
Refills: 0 | Status: DISCONTINUED | OUTPATIENT
Start: 2022-01-25 | End: 2022-01-26

## 2022-01-25 RX ORDER — ASPIRIN/CALCIUM CARB/MAGNESIUM 324 MG
1 TABLET ORAL
Qty: 0 | Refills: 0 | DISCHARGE
Start: 2022-01-25

## 2022-01-25 RX ORDER — MECLIZINE HCL 12.5 MG
1 TABLET ORAL
Qty: 21 | Refills: 0
Start: 2022-01-25 | End: 2022-01-31

## 2022-01-25 RX ORDER — ASPIRIN/CALCIUM CARB/MAGNESIUM 324 MG
1 TABLET ORAL
Qty: 0 | Refills: 0 | DISCHARGE

## 2022-01-25 RX ADMIN — Medication 325 MILLIGRAM(S): at 12:15

## 2022-01-25 RX ADMIN — HEPARIN SODIUM 5000 UNIT(S): 5000 INJECTION INTRAVENOUS; SUBCUTANEOUS at 12:15

## 2022-01-25 RX ADMIN — AMLODIPINE BESYLATE 2.5 MILLIGRAM(S): 2.5 TABLET ORAL at 05:28

## 2022-01-25 RX ADMIN — Medication 25 MILLIGRAM(S): at 05:28

## 2022-01-25 RX ADMIN — Medication 1 MILLIGRAM(S): at 12:15

## 2022-01-25 RX ADMIN — Medication 1 TABLET(S): at 12:16

## 2022-01-25 RX ADMIN — Medication 2 GRAM(S): at 05:28

## 2022-01-25 RX ADMIN — Medication 1000 UNIT(S): at 12:15

## 2022-01-25 RX ADMIN — HEPARIN SODIUM 5000 UNIT(S): 5000 INJECTION INTRAVENOUS; SUBCUTANEOUS at 22:52

## 2022-01-25 RX ADMIN — Medication 500 MILLIGRAM(S): at 22:52

## 2022-01-25 RX ADMIN — ATORVASTATIN CALCIUM 40 MILLIGRAM(S): 80 TABLET, FILM COATED ORAL at 01:02

## 2022-01-25 NOTE — PATIENT PROFILE ADULT - FALL HARM RISK - HARM RISK INTERVENTIONS

## 2022-01-25 NOTE — OCCUPATIONAL THERAPY INITIAL EVALUATION ADULT - RANGE OF MOTION EXAMINATION, UPPER EXTREMITY
Muscle strength UE's: 5/5 t/o. Fine motor skills: WFL's/bilateral UE Active ROM was WFL  (within functional limits)

## 2022-01-25 NOTE — DIETITIAN INITIAL EVALUATION ADULT. - ORAL INTAKE PTA/DIET HISTORY
Pt reports good PO intake PTA. Wife prepares meals and occasionally bring in take-out. Attempts to monitor sodium intake. Denies chewing/swallowing difficulties and confirms NKFA.

## 2022-01-25 NOTE — DIETITIAN INITIAL EVALUATION ADULT. - OTHER INFO
GI/Intake:  -Per pt, good PO intake of breakfast and lunch   -No BM noted thus far  -SLP recommended regular diet with thin liquids (1/24)     Neuro:   -Awaiting MRI   -?Vertigo     Endo:  -A1c: 6.6% - controlled DM  -Pt reports attempting to monitor carbohydrate intake   -No DM medication regimen ordered

## 2022-01-25 NOTE — OCCUPATIONAL THERAPY INITIAL EVALUATION ADULT - PERTINENT HX OF CURRENT PROBLEM, REHAB EVAL
77 y/o male with PMH HTN, CAD, s/p ptca x 1 for MI presented with severe dizziness on head movement upon waking up at home. Pt admitted 1/24/22 with dizziness. CT brain: (-) neuro event, +old lacunar infarct.

## 2022-01-25 NOTE — DISCHARGE NOTE PROVIDER - PROVIDER TOKENS
PROVIDER:[TOKEN:[[id number]]],FREE:[LAST:[gefken],FIRST:[bere],PHONE:[(   )    -],FAX:[(   )    -],ADDRESS:[[facility] / [place]]],FREE:[LAST:[olgaani],PHONE:[(   )    -],FAX:[(   )    -],ADDRESS:[heart doctor]]

## 2022-01-25 NOTE — OCCUPATIONAL THERAPY INITIAL EVALUATION ADULT - GENERAL OBSERVATIONS, REHAB EVAL
Patient was received and left supine with head elevated on stretcher in the ED with MD present. Patient was found supine in bed with +telemonitor and on room air.

## 2022-01-25 NOTE — DISCHARGE NOTE PROVIDER - HOSPITAL COURSE
admitted for dizziness  CVA ruled out on MRI  possible cavernous sinus thrombosis  DC after neuro clearance admitted for dizziness  CVA ruled out on MRI  MRV brain neg for sinus thrombosis  cleared by neuro for dc home

## 2022-01-25 NOTE — DISCHARGE NOTE PROVIDER - NSDCMRMEDTOKEN_GEN_ALL_CORE_FT
amLODIPine 2.5 mg oral tablet: 1 tab(s) orally once a day  aspirin 325 mg oral tablet: 1 tab(s) orally once a day  folic acid 1 mg oral tablet: 1 tab(s) orally once a day  meclizine 12.5 mg oral tablet: 1 tab(s) orally 3 times a day, As needed, Dizziness  Metoprolol Succinate ER 25 mg oral tablet, extended release: 1 tab(s) orally once a day  Multiple Vitamins oral tablet: 1 tab(s) orally once a day  niacin 500 mg oral tablet, extended release: 1 tab(s) orally once a day (at bedtime)  Omega-3 Fish Oil 1000 mg oral capsule: 2 cap(s) orally once a day  outpatient vestibular PT and OT:   rosuvastatin 20 mg oral tablet: 1 tab(s) orally once a day  Vitamin D3 25 mcg (1000 intl units) oral tablet: 1 tab(s) orally once a day   amLODIPine 2.5 mg oral tablet: 1 tab(s) orally once a day, resume tomorrow  aspirin 325 mg oral tablet: 1 tab(s) orally once a day  folic acid 1 mg oral tablet: 1 tab(s) orally once a day  meclizine 12.5 mg oral tablet: 1 tab(s) orally 3 times a day, As needed, Dizziness  Metoprolol Succinate ER 25 mg oral tablet, extended release: 1 tab(s) orally once a day  Multiple Vitamins oral tablet: 1 tab(s) orally once a day  niacin 500 mg oral tablet, extended release: 1 tab(s) orally once a day (at bedtime)  Omega-3 Fish Oil 1000 mg oral capsule: 2 cap(s) orally once a day  outpatient vestibular PT and OT:   rosuvastatin 20 mg oral tablet: 1 tab(s) orally once a day  Vitamin D3 25 mcg (1000 intl units) oral tablet: 1 tab(s) orally once a day

## 2022-01-25 NOTE — CONSULT NOTE ADULT - SUBJECTIVE AND OBJECTIVE BOX
[facility] Cardiology Consult - Karis Olmstead TsiakosYair [phone]    CHIEF COMPLAINT: Patient is a 76y old  Male who presents with a chief complaint of dizziness (24 Jan 2022 14:58)      HPI:  pt is a 75 yo male whose pmd is dr Mack, hx of htn cad hld sp ptca x1 for mi never smoker not a drinker was in [facility] until this am when he awoke with severe dizziness on head movement as he turned in bed at 7:30.  His LKW (when he went to sleep) was  11:30 pm last telma. he has no hx of same the sx are better now but he called 911 for evaluation.  [facility], accucheck, ekg and stroke score reported normal  no trauma no fever no chills no cp no sob no other co.  In ER , patient was seen by neuro  patient is being admitted for further work up and treatment. (24 Jan 2022 14:58)      PAST MEDICAL & SURGICAL HISTORY:  History of myocardial infarction  6/2004    Hyperlipidemia    Prostate cancer    Kidney neoplasm    H/O prostate biopsy  2000    H/O radical prostatectomy  4/2000    Stented coronary artery  LAD-6/2004    History of tonsillectomy  1953        SOCIAL HISTORY: Alochol: Denied  Smoking: Nonsmoker  Drug Use: Denied  Marital Status:         FAMILY HISTORY: FAMILY HISTORY:      MEDICATIONS  (STANDING):  amLODIPine   Tablet 2.5 milliGRAM(s) Oral daily  aspirin 325 milliGRAM(s) Oral daily  atorvastatin 40 milliGRAM(s) Oral at bedtime  cholecalciferol 1000 Unit(s) Oral daily  folic acid 1 milliGRAM(s) Oral daily  metoprolol succinate ER 25 milliGRAM(s) Oral daily  multivitamin 1 Tablet(s) Oral daily  niacin  milliGRAM(s) Oral at bedtime  omega-3-Acid Ethyl Esters 2 Gram(s) Oral two times a day    MEDICATIONS  (PRN):  meclizine 12.5 milliGRAM(s) Oral three times a day PRN Dizziness      Allergies    Altace (Other)  Plavix (Other)    Intolerances        REVIEW OF SYSTEMS:  CONSTITUTIONAL: No weakness, fevers or chills  EYES/ENT: No visual changes;  No vertigo or throat pain   NECK: No pain or stiffness  RESPIRATORY: No cough, wheezing, hemoptysis; No shortness of breath  CARDIOVASCULAR: No chest pain or palpitations  GASTROINTESTINAL: No abdominal pain. No nausea, vomiting, or hematemesis; No diarrhea or constipation. No melena or hematochezia.  GENITOURINARY: No dysuria, frequency or hematuria  NEUROLOGICAL: No numbness or weakness  SKIN: No itching or rash  All other review of systems is negative unless indicated above    VITAL SIGNS:   Vital Signs Last 24 Hrs  T(C): 36.8 (25 Jan 2022 04:15), Max: 36.8 (24 Jan 2022 20:34)  T(F): 98.3 (25 Jan 2022 04:15), Max: 98.3 (25 Jan 2022 04:15)  HR: 63 (25 Jan 2022 04:15) (63 - 69)  BP: 123/77 (25 Jan 2022 04:15) (123/77 - 146/84)  BP(mean): --  RR: 18 (25 Jan 2022 04:15) (18 - 18)  SpO2: 95% (25 Jan 2022 04:15) (95% - 100%)    I&O's Summary    24 Jan 2022 07:01  -  25 Jan 2022 07:00  --------------------------------------------------------  IN: 0 mL / OUT: 663 mL / NET: -663 mL        PHYSICAL EXAM:  Constitutional: Awake in NAD  HEENT:  RANDEE, EOMI  Neck: No JVD  Pulmonary: CTA B/L No R/R/W  Cardiovascular: PMI not palpable non-displaced Regular S1 and S2, no murmurs, rubs, gallops or clicks  Gastrointestinal: Bowel Sounds present, soft, nontender.   Extremities: Trace peripheral edema.   Neuro: No gross focal deficits    LABS: All Labs Reviewed:                        15.4   5.83  )-----------( 127      ( 24 Jan 2022 09:37 )             44.3     24 Jan 2022 09:37    139    |  107    |  24     ----------------------------<  147    3.7     |  26     |  1.60     Ca    8.8        24 Jan 2022 09:37    TPro  7.3    /  Alb  3.6    /  TBili  1.0    /  DBili  x      /  AST  18     /  ALT  23     /  AlkPhos  59     24 Jan 2022 09:37    PT/INR - ( 24 Jan 2022 09:37 )   PT: 12.6 sec;   INR: 1.08 ratio         PTT - ( 24 Jan 2022 09:37 )  PTT:30.9 sec  CARDIAC MARKERS ( 24 Jan 2022 09:37 )  x     / x     / 82 U/L / x     / <1.0 ng/mL      Blood Culture:     01-24 @ 09:37  TSH: 1.61      RADIOLOGY/EKG:     [facility] Cardiology Consult - Karis Olmstead TsiakosYair [phone]    CHIEF COMPLAINT: Patient is a 76y old  Male who presents with a chief complaint of dizziness (24 Jan 2022 14:58)      HPI:  pt is a 75 yo male whose pmd is dr Mack, hx of htn cad hld sp ptca x1 2004 for mi never smoker not a drinker was in [facility] until this am when he awoke with severe dizziness on head movement as he turned in bed at 7:30.  His LKW (when he went to sleep) was  11:30 pm last telma. he has no hx of same the sx are better now but he called 911 for evaluation.  [facility], accucheck, ekg and stroke score reported normal  no trauma no fever no chills no cp no sob no other co.  In ER , patient was seen by neuro  patient is being admitted for further work up and treatment. (24 Jan 2022 14:58)      PAST MEDICAL & SURGICAL HISTORY:  History of myocardial infarction  6/2004    Hyperlipidemia    Prostate cancer    Kidney neoplasm    H/O prostate biopsy  2000    H/O radical prostatectomy  4/2000    Stented coronary artery  LAD-6/2004    History of tonsillectomy  1953        SOCIAL HISTORY: Alochol: Denied  Smoking: Nonsmoker  Drug Use: Denied  Marital Status:         FAMILY HISTORY: FAMILY HISTORY:      MEDICATIONS  (STANDING):  amLODIPine   Tablet 2.5 milliGRAM(s) Oral daily  aspirin 325 milliGRAM(s) Oral daily  atorvastatin 40 milliGRAM(s) Oral at bedtime  cholecalciferol 1000 Unit(s) Oral daily  folic acid 1 milliGRAM(s) Oral daily  metoprolol succinate ER 25 milliGRAM(s) Oral daily  multivitamin 1 Tablet(s) Oral daily  niacin  milliGRAM(s) Oral at bedtime  omega-3-Acid Ethyl Esters 2 Gram(s) Oral two times a day    MEDICATIONS  (PRN):  meclizine 12.5 milliGRAM(s) Oral three times a day PRN Dizziness      Allergies    Altace (Other)  Plavix (Other)    Intolerances        REVIEW OF SYSTEMS:  CONSTITUTIONAL: No weakness, fevers or chills  EYES/ENT: No visual changes;  No vertigo or throat pain   NECK: No pain or stiffness  RESPIRATORY: No cough, wheezing, hemoptysis; No shortness of breath  CARDIOVASCULAR: No chest pain or palpitations  GASTROINTESTINAL: No abdominal pain. No nausea, vomiting, or hematemesis; No diarrhea or constipation. No melena or hematochezia.  GENITOURINARY: No dysuria, frequency or hematuria  NEUROLOGICAL: No numbness or weakness  SKIN: No itching or rash  All other review of systems is negative unless indicated above    VITAL SIGNS:   Vital Signs Last 24 Hrs  T(C): 36.8 (25 Jan 2022 04:15), Max: 36.8 (24 Jan 2022 20:34)  T(F): 98.3 (25 Jan 2022 04:15), Max: 98.3 (25 Jan 2022 04:15)  HR: 63 (25 Jan 2022 04:15) (63 - 69)  BP: 123/77 (25 Jan 2022 04:15) (123/77 - 146/84)  BP(mean): --  RR: 18 (25 Jan 2022 04:15) (18 - 18)  SpO2: 95% (25 Jan 2022 04:15) (95% - 100%)    I&O's Summary    24 Jan 2022 07:01  -  25 Jan 2022 07:00  --------------------------------------------------------  IN: 0 mL / OUT: 663 mL / NET: -663 mL        PHYSICAL EXAM:  Constitutional: Awake in NAD  HEENT:  RANDEE, EOMI  Neck: No JVD  Pulmonary: CTA B/L No R/R/W  Cardiovascular: PMI not palpable non-displaced Regular S1 and S2, no murmurs, rubs, gallops or clicks  Gastrointestinal: Bowel Sounds present, soft, nontender.   Extremities: Trace peripheral edema.   Neuro: No gross focal deficits    LABS: All Labs Reviewed:                        15.4   5.83  )-----------( 127      ( 24 Jan 2022 09:37 )             44.3     24 Jan 2022 09:37    139    |  107    |  24     ----------------------------<  147    3.7     |  26     |  1.60     Ca    8.8        24 Jan 2022 09:37    TPro  7.3    /  Alb  3.6    /  TBili  1.0    /  DBili  x      /  AST  18     /  ALT  23     /  AlkPhos  59     24 Jan 2022 09:37    PT/INR - ( 24 Jan 2022 09:37 )   PT: 12.6 sec;   INR: 1.08 ratio         PTT - ( 24 Jan 2022 09:37 )  PTT:30.9 sec  CARDIAC MARKERS ( 24 Jan 2022 09:37 )  x     / x     / 82 U/L / x     / <1.0 ng/mL      Blood Culture:     01-24 @ 09:37  TSH: 1.61      RADIOLOGY/EKG:

## 2022-01-25 NOTE — DISCHARGE NOTE PROVIDER - NSDCCPCAREPLAN_GEN_ALL_CORE_FT
PRINCIPAL DISCHARGE DIAGNOSIS  Diagnosis: Vertigo  Assessment and Plan of Treatment: follow up with Neuro MD dr. Rivas

## 2022-01-25 NOTE — DIETITIAN INITIAL EVALUATION ADULT. - CHIEF COMPLAINT
76y Male with PMH of kidney neoplasm, prostate CA, HLD. Pt admitted on 1/24 complaining of dizziness.

## 2022-01-25 NOTE — CONSULT NOTE ADULT - ASSESSMENT
Dizziness possible TIA    Neuro Eval  MRI Brain  Monitor on tele for arrhythmias  Aspirin 81 mg daily    Will follow   76M with known hx of CAD, s/p MI in 2004 s/p PCI, HLD a/w Positional Dizziness/vertigo lasting several hours associated with diaphoresis and nausea.  Possible TIA    Suggest:    1. Vertigo  - Neuro Eval  - Await MRI Brain  - Monitor on tele for arrhythmias  - Aspirin 81 mg daily  - Continue with Lipitor 40mg daily    2. CAD - stable   c/w Lipitor/Amlodipine/metoprolol    3. Will follow

## 2022-01-25 NOTE — GOALS OF CARE CONVERSATION - ADVANCED CARE PLANNING - CONVERSATION DETAILS

## 2022-01-25 NOTE — DISCHARGE NOTE PROVIDER - CARE PROVIDER_API CALL
Allan Rivas  NEUROLOGY  [address] [place] [address]  Phone: [phone]  Fax: [phone]  Follow Up Time:     bere pulido / casandra  Phone: (   )    -  Fax: (   )    -  Follow Up Time:     greyson james  Phone: (   )    -  Fax: (   )    -  Follow Up Time:

## 2022-01-25 NOTE — OCCUPATIONAL THERAPY INITIAL EVALUATION ADULT - ADDITIONAL COMMENTS
Pt lives in a house with 6 steps to enter with handrail. Pt lives in a split level house with no steps to enter via garage and 6 steps with handrail to each level, 12 steps total to access bedroom and full bath. Pt has a stall shower and bathtub. Pt does not own any DME. Pt drives a car. Pt performed functional ambulation in hospital room and in hallway with no devices at an independent level.

## 2022-01-26 ENCOUNTER — TRANSCRIPTION ENCOUNTER (OUTPATIENT)
Age: 77
End: 2022-01-26

## 2022-01-26 VITALS
SYSTOLIC BLOOD PRESSURE: 130 MMHG | RESPIRATION RATE: 18 BRPM | HEART RATE: 75 BPM | TEMPERATURE: 98 F | DIASTOLIC BLOOD PRESSURE: 84 MMHG | OXYGEN SATURATION: 97 %

## 2022-01-26 PROCEDURE — 82553 CREATINE MB FRACTION: CPT

## 2022-01-26 PROCEDURE — 70498 CT ANGIOGRAPHY NECK: CPT | Mod: MA

## 2022-01-26 PROCEDURE — 99285 EMERGENCY DEPT VISIT HI MDM: CPT | Mod: 25

## 2022-01-26 PROCEDURE — 85610 PROTHROMBIN TIME: CPT

## 2022-01-26 PROCEDURE — 36415 COLL VENOUS BLD VENIPUNCTURE: CPT

## 2022-01-26 PROCEDURE — 82550 ASSAY OF CK (CPK): CPT

## 2022-01-26 PROCEDURE — 70552 MRI BRAIN STEM W/DYE: CPT

## 2022-01-26 PROCEDURE — 97165 OT EVAL LOW COMPLEX 30 MIN: CPT

## 2022-01-26 PROCEDURE — 92522 EVALUATE SPEECH PRODUCTION: CPT

## 2022-01-26 PROCEDURE — 85730 THROMBOPLASTIN TIME PARTIAL: CPT

## 2022-01-26 PROCEDURE — 93005 ELECTROCARDIOGRAM TRACING: CPT

## 2022-01-26 PROCEDURE — 70551 MRI BRAIN STEM W/O DYE: CPT

## 2022-01-26 PROCEDURE — 87635 SARS-COV-2 COVID-19 AMP PRB: CPT

## 2022-01-26 PROCEDURE — 80053 COMPREHEN METABOLIC PANEL: CPT

## 2022-01-26 PROCEDURE — 84443 ASSAY THYROID STIM HORMONE: CPT

## 2022-01-26 PROCEDURE — 70450 CT HEAD/BRAIN W/O DYE: CPT | Mod: MA

## 2022-01-26 PROCEDURE — 0042T: CPT

## 2022-01-26 PROCEDURE — 80061 LIPID PANEL: CPT

## 2022-01-26 PROCEDURE — 84484 ASSAY OF TROPONIN QUANT: CPT

## 2022-01-26 PROCEDURE — 92610 EVALUATE SWALLOWING FUNCTION: CPT

## 2022-01-26 PROCEDURE — 70496 CT ANGIOGRAPHY HEAD: CPT | Mod: MA

## 2022-01-26 PROCEDURE — 82746 ASSAY OF FOLIC ACID SERUM: CPT

## 2022-01-26 PROCEDURE — A9579: CPT

## 2022-01-26 PROCEDURE — 85025 COMPLETE CBC W/AUTO DIFF WBC: CPT

## 2022-01-26 PROCEDURE — 82962 GLUCOSE BLOOD TEST: CPT

## 2022-01-26 PROCEDURE — 83036 HEMOGLOBIN GLYCOSYLATED A1C: CPT

## 2022-01-26 PROCEDURE — 86803 HEPATITIS C AB TEST: CPT

## 2022-01-26 PROCEDURE — 80048 BASIC METABOLIC PNL TOTAL CA: CPT

## 2022-01-26 PROCEDURE — 97161 PT EVAL LOW COMPLEX 20 MIN: CPT

## 2022-01-26 PROCEDURE — 71045 X-RAY EXAM CHEST 1 VIEW: CPT

## 2022-01-26 PROCEDURE — 82607 VITAMIN B-12: CPT

## 2022-01-26 PROCEDURE — 85027 COMPLETE CBC AUTOMATED: CPT

## 2022-01-26 RX ORDER — AMLODIPINE BESYLATE 2.5 MG/1
1 TABLET ORAL
Qty: 0 | Refills: 0 | DISCHARGE

## 2022-01-26 RX ORDER — MECLIZINE HCL 12.5 MG
1 TABLET ORAL
Qty: 21 | Refills: 0
Start: 2022-01-26 | End: 2022-02-01

## 2022-01-26 RX ADMIN — Medication 325 MILLIGRAM(S): at 12:15

## 2022-01-26 RX ADMIN — Medication 1 MILLIGRAM(S): at 12:15

## 2022-01-26 RX ADMIN — Medication 25 MILLIGRAM(S): at 06:30

## 2022-01-26 RX ADMIN — HEPARIN SODIUM 5000 UNIT(S): 5000 INJECTION INTRAVENOUS; SUBCUTANEOUS at 10:45

## 2022-01-26 RX ADMIN — Medication 2 GRAM(S): at 06:30

## 2022-01-26 RX ADMIN — Medication 1000 UNIT(S): at 12:16

## 2022-01-26 RX ADMIN — Medication 1 TABLET(S): at 12:16

## 2022-01-26 NOTE — DISCHARGE NOTE NURSING/CASE MANAGEMENT/SOCIAL WORK - NSDCPEFALRISK_GEN_ALL_CORE
For information on Fall & Injury Prevention, visit: [email or web address] OR  [email or web address] OR  https://www.cdc.gov/steadi/patient.html

## 2022-01-26 NOTE — PROGRESS NOTE ADULT - SUBJECTIVE AND OBJECTIVE BOX
Neurology Follow up note    ABRAHAM [id number]    HPI:  pt is a 75 yo male whose pmd is dr Mack, hx of htn cad hld sp ptca x1 for mi never smoker not a drinker was [facility] until this am when he awoke with severe dizziness on head movement as he turned in bed at 7:30.  His LKW (when he went to sleep) was  11:30 pm last telma. he has no hx of same the sx are better now but he called 911 for evaluation.  [facility], accucheck, ekg and stroke score reported normal  no trauma no fever no chills no cp no sob no other co.  In ER , patient was seen by neuro  patient is being admitted for further work up and treatment. (24 Jan 2022 14:58)      Interval History ; 10 sec episode of dizziness with head movement during mri    Patient is seen, chart was reviewed and case was discussed with the treatment team.  Pt is not in any distress.   Lying on bed comfortably.     Vital Signs Last 24 Hrs  T(C): 36.9 (26 Jan 2022 05:07), Max: 36.9 (25 Jan 2022 19:57)  T(F): 98.4 (26 Jan 2022 05:07), Max: 98.4 (25 Jan 2022 19:57)  HR: 89 (26 Jan 2022 05:07) (89 - 91)  BP: 104/71 (26 Jan 2022 05:07) (104/71 - 135/76)  BP(mean): --  RR: 18 (26 Jan 2022 05:07) (18 - 18)  SpO2: 95% (26 Jan 2022 05:07) (93% - 95%)        REVIEW OF SYSTEMS:    Constitutional: No fever, weight loss or fatigue  Eyes: No eye pain, visual disturbances, or discharge  ENT:  No difficulty hearing, tinnitus, vertigo; No sinus or throat pain  Neck: No pain or stiffness  Respiratory: No cough, wheezing, chills or hemoptysis  Cardiovascular: No chest pain, palpitations, shortness of breath, d leg swelling  Gastrointestinal: No abdominal or epigastric pain. No nausea, vomiting or hematemesis; No diarrhea or constipation. No melena or hematochezia.  Genitourinary: No dysuria, frequency, hematuria or incontinence  Neurological: No headaches, memory loss, loss of strength, numbness or tremors  Psychiatric: No depression, anxiety, mood swings or difficulty sleeping  Musculoskeletal: No joint pain or swelling; No muscle, back or extremity pain  Skin: No itching, burning, rashes or lesions   Lymph Nodes: No enlarged glands  Endocrine: No heat or cold intolerance; No hair loss,  Allergy and Immunologic: No hives or eczema    On Neurological Examination:    Mental Status - Pt is alert, awake, oriented X3. Higher functions are intac Follows commands well and able to answer questions appropriately.Mood and affect  normal    Speech -  Normal.    Cranial Nerves - Pupils 3 mm equal and reactive to light, extraocular eye movements intact. Pt has no visual field deficit.  Pt has no  facial asymmetry. Facial sensation is intact.Tongue - is in midline.    Muscle tone - is normal     Motor Exam - 5/5 all over, No drift. No shaking or tremors.    Sensory Exam - . Pt withdraws all extremities equally on stimulation. No asymmetry seen. No complaints of tingling, numbness.    Gait - Able to stand and walk unassisted.      coordination:    Finger to nose: normal    Deep tendon Reflexes - 2 plus all over.          Neck Supple -  Yes.     MEDICATIONS    aspirin 325 milliGRAM(s) Oral daily  atorvastatin 40 milliGRAM(s) Oral at bedtime  cholecalciferol 1000 Unit(s) Oral daily  folic acid 1 milliGRAM(s) Oral daily  heparin   Injectable 5000 Unit(s) SubCutaneous every 12 hours  meclizine 12.5 milliGRAM(s) Oral three times a day PRN  metoprolol succinate ER 25 milliGRAM(s) Oral daily  multivitamin 1 Tablet(s) Oral daily  niacin  milliGRAM(s) Oral at bedtime  omega-3-Acid Ethyl Esters 2 Gram(s) Oral two times a day      Allergies    Altace (Other)  Plavix (Other)    Intolerances                          15.2   5.54  )-----------( 118      ( 25 Jan 2022 07:56 )             42.9       Hemoglobin A1C:   Lipid Panel 01-25 @ 00:00  Total Cholesterol, Serum 124  LDL --  Triglycerides 99    Vitamin B12 Vitamin B12, Serum: 362 pg/mL (01-25 @ 00:00)      RADIOLOGY    < from: MR Head No Cont (01.25.22 @ 11:19) >    ACC: [id number] EXAM:  MR BRAIN                          PROCEDURE DATE:  01/25/2022          INTERPRETATION:  INDICATION:    Vertigo  TECHNIQUE:  Multiplanar brain imaging was conducted. T1, T2 and FLAIR   imaging was performed.  In addition, diffusion imaging, diffusion   coefficient assessment (ADC) and T2* was incorporated . No contrast   administered.  COMPARISON EXAMINATION:  CT dated 1/24/2022    FINDINGS:    HEMISPHERES: There are scattered small lacunar infarcts and mild chronic   ischemic changes in the basal ganglia, thalami, and white matter. No   diffusion restriction or acute ischemia is noted. No microhemorrhage or   mass lesion is identified.  VENTRICLES:  Midline with mild ex vacuo enlargement  POSTERIOR FOSSA:  The brain stem and cerebellum are unremarkable in   appearance.  No CP angle abnormality is noted.  EXTRA-AXIAL:  No mass or collections are depicted.  VASCULATURE:  There is intraluminal signal in the right transverse and   sigmoid sinus, and venous sinuses thrombosis cannot be excluded.. The   other venous sinuses are patent.  SINUSES AND MASTOIDS:  Minimal scattered mucosal thickening noted in   sinuses. Mastoids are clear.  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    IMPRESSION:    1) There is intraluminal signal in the right transverse and sigmoid   sinus, and venous sinus thrombosis cannot be excluded. A MR venous study   of the brain is therefore recommended with contrast.. Further clinical   correlation recommended.  2)  otherwise, there are scattered chronic ischemic changes in the brain   with volume loss. No diffusion restriction or acute ischemia.    ---             MONICA MCCABE MD; Attending Radiologist  This document has been electronically signed. Jan 25 2022 11:57AM    < end of copied text >  ASSESSMENT AND PLAN:      seen for dizziness likely tia  brain mri- no acute cva; ?  mri of the head with contrast and MRV No evidence of right transverse and sigmoid sinuses thrombosis    management dw dr perlman  neuro wise stable for dc home  Physical therapy evaluation.  OOB to chair/ambulation with assistance only.  Pain is accessed and addressed.  Would continue to follow.      
Patient is a 76y old  Male who presents with a chief complaint of dizziness (25 Jan 2022 18:30)      INTERVAL /OVERNIGHT EVENTS: feels better    MEDICATIONS  (STANDING):  aspirin 325 milliGRAM(s) Oral daily  atorvastatin 40 milliGRAM(s) Oral at bedtime  cholecalciferol 1000 Unit(s) Oral daily  folic acid 1 milliGRAM(s) Oral daily  heparin   Injectable 5000 Unit(s) SubCutaneous every 12 hours  metoprolol succinate ER 25 milliGRAM(s) Oral daily  multivitamin 1 Tablet(s) Oral daily  niacin  milliGRAM(s) Oral at bedtime  omega-3-Acid Ethyl Esters 2 Gram(s) Oral two times a day    MEDICATIONS  (PRN):  meclizine 12.5 milliGRAM(s) Oral three times a day PRN Dizziness      Allergies    Altace (Other)  Plavix (Other)    Intolerances        REVIEW OF SYSTEMS:  CONSTITUTIONAL: No fever, weight loss, or fatigue  EYES: No eye pain, visual disturbances, or discharge  ENMT:  No difficulty hearing, tinnitus, vertigo; No sinus or throat pain  NECK: No pain or stiffness  RESPIRATORY: No cough, wheezing, chills or hemoptysis; No shortness of breath  CARDIOVASCULAR: No chest pain, palpitations, dizziness, or leg swelling  GASTROINTESTINAL: No abdominal or epigastric pain. No nausea, vomiting, or hematemesis; No diarrhea or constipation. No melena or hematochezia.  GENITOURINARY: No dysuria, frequency, hematuria, or incontinence  NEUROLOGICAL: No headaches, memory loss, loss of strength, numbness, or tremors  SKIN: No itching, burning, rashes, or lesions   LYMPH NODES: No enlarged glands  ENDOCRINE: No heat or cold intolerance; No hair loss; No polydipsia or polyuria  MUSCULOSKELETAL: No joint pain or swelling; No muscle, back, or extremity pain  PSYCHIATRIC: No depression, anxiety, mood swings, or difficulty sleeping  HEME/LYMPH: No easy bruising, or bleeding gums  ALLERGY AND IMMUNOLOGIC: No hives or eczema    Vital Signs Last 24 Hrs  T(C): 36.9 (25 Jan 2022 19:57), Max: 36.9 (25 Jan 2022 19:57)  T(F): 98.4 (25 Jan 2022 19:57), Max: 98.4 (25 Jan 2022 19:57)  HR: 91 (25 Jan 2022 19:57) (63 - 91)  BP: 135/76 (25 Jan 2022 19:57) (123/77 - 149/89)  BP(mean): --  RR: 18 (25 Jan 2022 19:57) (18 - 18)  SpO2: 93% (25 Jan 2022 19:57) (93% - 97%)    PHYSICAL EXAM:  GENERAL: NAD, well-groomed, well-developed  HEAD:  Atraumatic, Normocephalic  EYES: EOMI, PERRLA, conjunctiva and sclera clear  ENMT: No tonsillar erythema, exudates, or enlargement; Moist mucous membranes, Good dentition, No lesions  NECK: Supple, No JVD, Normal thyroid  NERVOUS SYSTEM:  Alert & Oriented X3, Good concentration; Motor Strength 5/5 B/L upper and lower extremities; DTRs 2+ intact and symmetric  CHEST/LUNG: Clear to auscultation bilaterally; No rales, rhonchi, wheezing, or rubs  HEART: Regular rate and rhythm; No murmurs, rubs, or gallops  ABDOMEN: Soft, Nontender, Nondistended; Bowel sounds present  EXTREMITIES:  2+ Peripheral Pulses, No clubbing, cyanosis, or edema  LYMPH: No lymphadenopathy noted  SKIN: No rashes or lesions    LABS:                        15.2   5.54  )-----------( 118      ( 25 Jan 2022 07:56 )             42.9     25 Jan 2022 07:56    139    |  107    |  19     ----------------------------<  147    4.0     |  26     |  1.50     Ca    8.8        25 Jan 2022 07:56      PT/INR - ( 24 Jan 2022 09:37 )   PT: 12.6 sec;   INR: 1.08 ratio         PTT - ( 24 Jan 2022 09:37 )  PTT:30.9 sec    CAPILLARY BLOOD GLUCOSE      POCT Blood Glucose.: 153 mg/dL (25 Jan 2022 16:57)  POCT Blood Glucose.: 106 mg/dL (25 Jan 2022 11:57)  POCT Blood Glucose.: 115 mg/dL (25 Jan 2022 08:12)      RADIOLOGY & ADDITIONAL TESTS:    Notes Reviewed:  [x ] YES  [ ] NO    Care Discussed with Consultants/Other Providers [x ] YES  [ ] NO
neuro cons dict  seen dizziness probably peripheral  r/o Post circulation cva  [facility]  m[facility] of the head without  echo  ap/statin  PT
Neurology Follow up note    ABRAHAM [id number]    HPI:  pt is a 77 yo male whose pmd is dr Mack, hx of htn cad hld sp ptca x1 for mi never smoker not a drinker was in [facility] until this am when he awoke with severe dizziness on head movement as he turned in bed at 7:30.  His LKW (when he went to sleep) was  11:30 pm last telma. he has no hx of same the sx are better now but he called 911 for evaluation.  [facility], accucheck, ekg and stroke score reported normal  no trauma no fever no chills no cp no sob no other co.  In ER , patient was seen by neuro  patient is being admitted for further work up and treatment. (24 Jan 2022 14:58)      Interval History -dizziness improving    Patient is seen, chart was reviewed and case was discussed with the treatment team.  Pt is not in any distress.   Lying on bed comfortably.     Vital Signs Last 24 Hrs  T(C): 36.8 (25 Jan 2022 04:15), Max: 36.8 (24 Jan 2022 20:34)  T(F): 98.3 (25 Jan 2022 04:15), Max: 98.3 (25 Jan 2022 04:15)  HR: 73 (25 Jan 2022 08:05) (63 - 73)  BP: 149/89 (25 Jan 2022 08:05) (123/77 - 149/89)  BP(mean): --  RR: 18 (25 Jan 2022 04:15) (18 - 18)  SpO2: 97% (25 Jan 2022 08:05) (95% - 98%)        REVIEW OF SYSTEMS:    Constitutional: No fever, weight loss or fatigue  Eyes: No eye pain, visual disturbances, or discharge  ENT:  No difficulty hearing, tinnitus, vertigo; No sinus or throat pain  Neck: No pain or stiffness  Respiratory: No cough, wheezing, chills or hemoptysis  Cardiovascular: No chest pain, palpitations, shortness of breath, d leg swelling  Gastrointestinal: No abdominal or epigastric pain. No nausea, vomiting or hematemesis; No diarrhea or constipation. No melena or hematochezia.  Genitourinary: No dysuria, frequency, hematuria or incontinence  Neurological: No headaches, memory loss, loss of strength, numbness or tremors  Psychiatric: No depression, anxiety, mood swings or difficulty sleeping  Musculoskeletal: No joint pain or swelling; No muscle, back or extremity pain  Skin: No itching, burning, rashes or lesions   Lymph Nodes: No enlarged glands  Endocrine: No heat or cold intolerance; No hair loss,  Allergy and Immunologic: No hives or eczema    On Neurological Examination:    Mental Status - Pt is alert, awake, oriented X3. Higher functions are intac Follows commands well and able to answer questions appropriately.Mood and affect  normal    Speech -  Normal.    Cranial Nerves - Pupils 3 mm equal and reactive to light, extraocular eye movements intact. Pt has no visual field deficit.  Pt has no  facial asymmetry. Facial sensation is intact.Tongue - is in midline.    Muscle tone - is normal     Motor Exam - 5/5 all over, No drift. No shaking or tremors.    Sensory Exam - . Pt withdraws all extremities equally on stimulation. No asymmetry seen. No complaints of tingling, numbness.    Gait - Able to stand and walk unassisted.      coordination:    Finger to nose: normal    Deep tendon Reflexes - 2 plus all over.          Neck Supple -  Yes.     MEDICATIONS    aspirin 325 milliGRAM(s) Oral daily  atorvastatin 40 milliGRAM(s) Oral at bedtime  cholecalciferol 1000 Unit(s) Oral daily  folic acid 1 milliGRAM(s) Oral daily  heparin   Injectable 5000 Unit(s) SubCutaneous every 12 hours  meclizine 12.5 milliGRAM(s) Oral three times a day PRN  metoprolol succinate ER 25 milliGRAM(s) Oral daily  multivitamin 1 Tablet(s) Oral daily  niacin  milliGRAM(s) Oral at bedtime  omega-3-Acid Ethyl Esters 2 Gram(s) Oral two times a day      Allergies    Altace (Other)  Plavix (Other)    Intolerances        LABS:  CBC Full  -  ( 25 Jan 2022 07:56 )  WBC Count : 5.54 K/uL  RBC Count : 4.65 M/uL  Hemoglobin : 15.2 g/dL  Hematocrit : 42.9 %  Platelet Count - Automated : 118 K/uL  Mean Cell Volume : 92.3 fl        01-25    139  |  107  |  19  ----------------------------<  147<H>  4.0   |  26  |  1.50<H>    Ca    8.8      25 Jan 2022 07:56    TPro  7.3  /  Alb  3.6  /  TBili  1.0  /  DBili  x   /  AST  18  /  ALT  23  /  AlkPhos  59  01-24    Hemoglobin A1C:   Lipid Panel 01-25 @ 00:00  Total Cholesterol, Serum 124  LDL --  Triglycerides 99    Vitamin B12 Vitamin B12, Serum: 362 pg/mL (01-25 @ 00:00)      RADIOLOGY    < from: MR Head No Cont (01.25.22 @ 11:19) >    ACC: [id number] EXAM:  MR BRAIN                          PROCEDURE DATE:  01/25/2022          INTERPRETATION:  INDICATION:    Vertigo  TECHNIQUE:  Multiplanar brain imaging was conducted. T1, T2 and FLAIR   imaging was performed.  In addition, diffusion imaging, diffusion   coefficient assessment (ADC) and T2* was incorporated . No contrast   administered.  COMPARISON EXAMINATION:  CT dated 1/24/2022    FINDINGS:    HEMISPHERES: There are scattered small lacunar infarcts and mild chronic   ischemic changes in the basal ganglia, thalami, and white matter. No   diffusion restriction or acute ischemia is noted. No microhemorrhage or   mass lesion is identified.  VENTRICLES:  Midline with mild ex vacuo enlargement  POSTERIOR FOSSA:  The brain stem and cerebellum are unremarkable in   appearance.  No CP angle abnormality is noted.  EXTRA-AXIAL:  No mass or collections are depicted.  VASCULATURE:  There is intraluminal signal in the right transverse and   sigmoid sinus, and venous sinuses thrombosis cannot be excluded.. The   other venous sinuses are patent.  SINUSES AND MASTOIDS:  Minimal scattered mucosal thickening noted in   sinuses. Mastoids are clear.  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    IMPRESSION:    1) There is intraluminal signal in the right transverse and sigmoid   sinus, and venous sinus thrombosis cannot be excluded. A MR venous study   of the brain is therefore recommended with contrast.. Further clinical   correlation recommended.  2)  otherwise, there are scattered chronic ischemic changes in the brain   with volume loss. No diffusion restriction or acute ischemia.    ---             MONICA MCCABE MD; Attending Radiologist  This document has been electronically signed. Jan 25 2022 11:57AM    < end of copied text >  ASSESSMENT AND PLAN:      seen for dizziness  brain mri- no acute cva; ? right transverse and sigmoid sinuses thrombosis    for brain mri with contrast and mrv  management dw patient son  Tiago  Physical therapy evaluation.  OOB to chair/ambulation with assistance only.  Pain is accessed and addressed.  Would continue to follow.              
Patient is a 76y Male with a known history of :  Dizziness [R42]    Preventive measure [Z29.9]      HPI:  pt is a 75 yo male whose pmd is dr Mack, hx of htn cad hld sp ptca x1 for mi never smoker not a drinker was in [facility] until this am when he awoke with severe dizziness on head movement as he turned in bed at 7:30.  His LKW (when he went to sleep) was  11:30 pm last telma. he has no hx of same the sx are better now but he called 911 for evaluation.  [facility], accucheck, ekg and stroke score reported normal  no trauma no fever no chills no cp no sob no other co.  In ER , patient was seen by neuro  patient is being admitted for further work up and treatment. (24 Jan 2022 14:58)      REVIEW OF SYSTEMS:    CONSTITUTIONAL: No weakness, fevers or chills  EYES/ENT: No visual changes;  No vertigo or throat pain   NECK: No pain or stiffness  RESPIRATORY: No cough, wheezing, hemoptysis; No shortness of breath  CARDIOVASCULAR: No chest pain or palpitations  GASTROINTESTINAL: No abdominal pain. No nausea, vomiting, or hematemesis; No diarrhea or constipation. No melena or hematochezia.  GENITOURINARY: No dysuria, frequency or hematuria  NEUROLOGICAL: No numbness or weakness  SKIN: No itching or rash  All other review of systems is negative unless indicated above      MEDICATIONS  (STANDING):  aspirin 325 milliGRAM(s) Oral daily  atorvastatin 40 milliGRAM(s) Oral at bedtime  cholecalciferol 1000 Unit(s) Oral daily  folic acid 1 milliGRAM(s) Oral daily  heparin   Injectable 5000 Unit(s) SubCutaneous every 12 hours  metoprolol succinate ER 25 milliGRAM(s) Oral daily  multivitamin 1 Tablet(s) Oral daily  niacin  milliGRAM(s) Oral at bedtime  omega-3-Acid Ethyl Esters 2 Gram(s) Oral two times a day    MEDICATIONS  (PRN):  meclizine 12.5 milliGRAM(s) Oral three times a day PRN Dizziness      ALLERGIES: Altace (Other)  Plavix (Other)      FAMILY HISTORY:      PHYSICAL EXAMINATION:  -----------------------------  T(C): 36.9 (01-26-22 @ 05:07), Max: 36.9 (01-25-22 @ 19:57)  HR: 89 (01-26-22 @ 05:07) (89 - 91)  BP: 104/71 (01-26-22 @ 05:07) (104/71 - 135/76)  RR: 18 (01-26-22 @ 05:07) (18 - 18)  SpO2: 95% (01-26-22 @ 05:07) (93% - 95%)  Wt(kg): --    01-25 @ 07:01  -  01-26 @ 07:00  --------------------------------------------------------  IN:  Total IN: 0 mL    OUT:    Voided (mL): 800 mL  Total OUT: 800 mL    Total NET: -800 mL      PHYSICAL EXAM:  Constitutional: Awake in NAD  HEENT:  RANDEE, EOMI  Neck: No JVD  Pulmonary: CTA B/L No R/R/W  Cardiovascular: PMI not palpable non-displaced Regular S1 and S2, no murmurs, rubs, gallops or clicks  Gastrointestinal: Bowel Sounds present, soft, nontender.   Extremities: Trace peripheral edema.   Neuro: No gross focal deficits    LABS: All Labs Reviewed:          LABS:   --------  01-25    139  |  107  |  19  ----------------------------<  147<H>  4.0   |  26  |  1.50<H>    Ca    8.8      25 Jan 2022 07:56    TPro  7.3  /  Alb  3.6  /  TBili  1.0  /  DBili  x   /  AST  18  /  ALT  23  /  AlkPhos  59  01-24                         15.2   5.54  )-----------( 118      ( 25 Jan 2022 07:56 )             42.9     PT/INR - ( 24 Jan 2022 09:37 )   PT: 12.6 sec;   INR: 1.08 ratio         PTT - ( 24 Jan 2022 09:37 )  PTT:30.9 sec      124 mg/dL, --, 52 mg/dL, 99 mg/dL        
Patient is a 76y old  Male who presents with a chief complaint of dizziness (26 Jan 2022 08:26)  feels well without complaints      INTERVAL HPI/OVERNIGHT EVENTS:  T(C): 36.9 (01-26-22 @ 05:07), Max: 36.9 (01-25-22 @ 19:57)  HR: 89 (01-26-22 @ 05:07) (89 - 91)  BP: 104/71 (01-26-22 @ 05:07) (104/71 - 135/76)  RR: 18 (01-26-22 @ 05:07) (18 - 18)  SpO2: 95% (01-26-22 @ 05:07) (93% - 95%)  Wt(kg): --  I&O's Summary    25 Jan 2022 07:01  -  26 Jan 2022 07:00  --------------------------------------------------------  IN: 0 mL / OUT: 800 mL / NET: -800 mL        LABS:                        15.2   5.54  )-----------( 118      ( 25 Jan 2022 07:56 )             42.9     01-25    139  |  107  |  19  ----------------------------<  147<H>  4.0   |  26  |  1.50<H>    Ca    8.8      25 Jan 2022 07:56          CAPILLARY BLOOD GLUCOSE      POCT Blood Glucose.: 153 mg/dL (25 Jan 2022 16:57)            MEDICATIONS  (STANDING):  aspirin 325 milliGRAM(s) Oral daily  atorvastatin 40 milliGRAM(s) Oral at bedtime  cholecalciferol 1000 Unit(s) Oral daily  folic acid 1 milliGRAM(s) Oral daily  heparin   Injectable 5000 Unit(s) SubCutaneous every 12 hours  metoprolol succinate ER 25 milliGRAM(s) Oral daily  multivitamin 1 Tablet(s) Oral daily  niacin  milliGRAM(s) Oral at bedtime  omega-3-Acid Ethyl Esters 2 Gram(s) Oral two times a day    MEDICATIONS  (PRN):  meclizine 12.5 milliGRAM(s) Oral three times a day PRN Dizziness      REVIEW OF SYSTEMS:  CONSTITUTIONAL: No fever, weight loss, or fatigue  EYES: No eye pain, visual disturbances, or discharge  ENMT:  vertigo,  No difficulty hearing, tinnitus,; No sinus or throat pain  NECK: No pain or stiffness  RESPIRATORY: No cough, wheezing, chills or hemoptysis; No shortness of breath  CARDIOVASCULAR: No chest pain, palpitations, dizziness, or leg swelling  GASTROINTESTINAL: No abdominal or epigastric pain. No nausea, vomiting, or hematemesis; No diarrhea or constipation. No melena or hematochezia.  GENITOURINARY: No dysuria, frequency, hematuria, or incontinence  NEUROLOGICAL: No headaches, memory loss, loss of strength, numbness, or tremors  SKIN: No itching, burning, rashes, or lesions   LYMPH NODES: No enlarged glands  ENDOCRINE: No heat or cold intolerance; No hair loss  MUSCULOSKELETAL: No joint pain or swelling; No muscle, back, or extremity pain  PSYCHIATRIC: No depression, anxiety, mood swings, or difficulty sleeping  HEME/LYMPH: No easy bruising, or bleeding gums  ALLERY AND IMMUNOLOGIC: No hives or eczema    RADIOLOGY & ADDITIONAL TESTS:    Imaging Personally Reviewed:  [x ] YES  [ ] NO    Consultant(s) Notes Reviewed:  [x ] YES  [ ] NO    PHYSICAL EXAM:  GENERAL: NAD, well-groomed, well-developed  HEAD:  Atraumatic, Normocephalic  EYES: EOMI, PERRLA, conjunctiva and sclera clear  ENMT: No tonsillar erythema, exudates, or enlargement; Moist mucous membranes, Good dentition, No lesions  NECK: Supple, No JVD, Normal thyroid  NERVOUS SYSTEM:  Alert & Oriented X3, Good concentration; Motor Strength 5/5 B/L upper and lower extremities; DTRs 2+ intact and symmetric  CHEST/LUNG: Clear to percussion bilaterally; No rales, rhonchi, wheezing, or rubs  HEART: Regular rate and rhythm; No murmurs, rubs, or gallops  ABDOMEN: Soft, Nontender, Nondistended; Bowel sounds present  EXTREMITIES:  2+ Peripheral Pulses, No clubbing, cyanosis, or edema  LYMPH: No lymphadenopathy noted  SKIN: No rashes or lesions    Care Discussed with Consultants/Other Providers [x ] YES  [ ] NO    advance care planning and advance directives discussed, including but not limited to long term care planning [x]YES   [ ]NO

## 2022-01-26 NOTE — DISCHARGE NOTE NURSING/CASE MANAGEMENT/SOCIAL WORK - PATIENT PORTAL LINK FT
You can access the FollowMyHealth Patient Portal offered by [facility] by registering at the following website: [email or web address]. By joining [facility]’s FollowMyHealth portal, you will also be able to view your health information using other applications (apps) compatible with our system.

## 2022-01-26 NOTE — PROGRESS NOTE ADULT - ASSESSMENT
76M with known hx of CAD, s/p MI in 2004 s/p PCI, HLD a/w Positional Dizziness/vertigo lasting several hours associated with diaphoresis and nausea.  Possible TIA    1. Vertigo  - Neuro Eval  - Await MRI Brain  - Monitor on tele for arrhythmias  - Aspirin 81 mg daily  - Continue with Lipitor 40mg daily    2. CAD - stable   c/w Lipitor, metoprolol. Amlodipine on hold for now avoid hypoperfusion.     3. Will follow

## 2022-01-26 NOTE — PROGRESS NOTE ADULT - PROBLEM SELECTOR PLAN 3
ASA for antiplatelet therapy  check MRI  neuro eval
ASA for antiplatelet therapy  mri/mriv neg  neuro eval

## 2022-01-26 NOTE — PROGRESS NOTE ADULT - PROBLEM SELECTOR PLAN 1
add meclizine  neuro eval with dr. coffey  mri neg for cva  mrv neg for venous sinus thrombosis
add meclizine  neuro eval with dr. coffey  doubt cva

## 2022-11-03 ENCOUNTER — NON-APPOINTMENT (OUTPATIENT)
Age: 77
End: 2022-11-03

## 2023-01-03 ENCOUNTER — NON-APPOINTMENT (OUTPATIENT)
Age: 78
End: 2023-01-03

## 2023-01-03 ENCOUNTER — APPOINTMENT (OUTPATIENT)
Dept: GASTROENTEROLOGY | Facility: CLINIC | Age: 78
End: 2023-01-03
Payer: MEDICARE

## 2023-01-03 VITALS
HEART RATE: 63 BPM | WEIGHT: 175 LBS | HEIGHT: 71 IN | DIASTOLIC BLOOD PRESSURE: 74 MMHG | BODY MASS INDEX: 24.5 KG/M2 | TEMPERATURE: 97.7 F | SYSTOLIC BLOOD PRESSURE: 138 MMHG | OXYGEN SATURATION: 98 %

## 2023-01-03 DIAGNOSIS — R10.13 EPIGASTRIC PAIN: ICD-10-CM

## 2023-01-03 PROCEDURE — 99214 OFFICE O/P EST MOD 30 MIN: CPT

## 2023-01-03 RX ORDER — MILK THISTLE 150 MG
500 CAPSULE ORAL
Refills: 0 | Status: ACTIVE | COMMUNITY

## 2023-01-03 RX ORDER — OXYBUTYNIN CHLORIDE 5 MG/1
5 TABLET ORAL
Qty: 15 | Refills: 0 | Status: DISCONTINUED | COMMUNITY
Start: 2020-02-20 | End: 2023-01-03

## 2023-01-03 RX ORDER — CHLORHEXIDINE GLUCONATE, 0.12% ORAL RINSE 1.2 MG/ML
0.12 SOLUTION DENTAL
Qty: 473 | Refills: 0 | Status: DISCONTINUED | COMMUNITY
Start: 2019-11-13 | End: 2023-01-03

## 2023-01-03 RX ORDER — TAMSULOSIN HYDROCHLORIDE 0.4 MG/1
0.4 CAPSULE ORAL
Qty: 30 | Refills: 0 | Status: DISCONTINUED | COMMUNITY
Start: 2020-02-20 | End: 2023-01-03

## 2023-01-03 RX ORDER — FAMOTIDINE 20 MG/1
20 TABLET, FILM COATED ORAL TWICE DAILY
Qty: 60 | Refills: 5 | Status: ACTIVE | COMMUNITY
Start: 2023-01-03

## 2023-01-03 RX ORDER — CEFDINIR 300 MG/1
300 CAPSULE ORAL
Qty: 24 | Refills: 0 | Status: DISCONTINUED | COMMUNITY
Start: 2020-02-20 | End: 2023-01-03

## 2023-01-03 RX ORDER — SULFAMETHOXAZOLE AND TRIMETHOPRIM 800; 160 MG/1; MG/1
800-160 TABLET ORAL
Qty: 10 | Refills: 0 | Status: DISCONTINUED | COMMUNITY
Start: 2020-02-13 | End: 2023-01-03

## 2023-01-03 RX ORDER — SODIUM SULFATE, POTASSIUM SULFATE, MAGNESIUM SULFATE 17.5; 3.13; 1.6 G/ML; G/ML; G/ML
17.5-3.13-1.6 SOLUTION, CONCENTRATE ORAL
Qty: 1 | Refills: 0 | Status: DISCONTINUED | COMMUNITY
Start: 2019-11-26 | End: 2023-01-03

## 2023-01-03 RX ORDER — OXYCODONE 5 MG/1
5 TABLET ORAL
Qty: 9 | Refills: 0 | Status: DISCONTINUED | COMMUNITY
Start: 2020-02-20 | End: 2023-01-03

## 2023-01-03 NOTE — ASSESSMENT
[FreeTextEntry1] : Epigastric pain possibly due to gastritis or constipation.  Patient advised to supplement the stool softener with MiraLAX for the next few days.  Laboratory studies were ordered to exclude acute pancreatitis and he is referred for a CT of the abdomen and pelvis.  This was discussed with the patient.  He understands and all questions were answered.

## 2023-01-03 NOTE — PHYSICAL EXAM
[None] : no edema [Normal] : normal bowel sounds, non-tender, no masses, soft, no no hepato-splenomegaly [Cervical Lymph Nodes Enlarged Posterior Bilaterally] : no posterior cervical lymphadenopathy [Supraclavicular Lymph Nodes Enlarged Bilaterally] : no supraclavicular lymphadenopathy [No CVA Tenderness] : no CVA  tenderness

## 2023-01-03 NOTE — HISTORY OF PRESENT ILLNESS
[FreeTextEntry1] : 77-year-old man complains of 2 days of intermittent epigastric pain.  Began after he over ate Saturday evening.  He now complains of epigastric pain associated with a hard bowel movement.  He moved his bowels this morning and is feeling better.  He denies any nausea or vomiting.  He has a history of of premyeloma syndrome.  He denies rectal bleeding, melena or hematemesis.  He has a history of coronary artery disease status post stenting.  However he denies any chest pain in the epigastric discomfort related to any previous cardiac complaints.  He has no shortness.

## 2024-09-17 NOTE — ED PROVIDER NOTE - CADM POA URETHRAL CATHETER
The patient is Stable - Low risk of patient condition declining or worsening    Shift Goals  Clinical Goals: remain free from falls, monitor BM, replace electrolytes, manage pain at or above 3/10 with medication per MAR, tolerate meals  Patient Goals: rest, manage pain, tolerate meals  Family Goals: N/A    Progress made toward(s) clinical / shift goals:  Pt had Multiple BM, green and watery, Pt stated he felt less watery ON BM. No PRN pain medication given. Zofran given once for nausea. Pt tolerating clear liquid diet. Pt seen awake during the rounds.     Patient is not progressing towards the following goals: N/A       No

## 2025-01-10 ENCOUNTER — APPOINTMENT (OUTPATIENT)
Dept: GASTROENTEROLOGY | Facility: CLINIC | Age: 80
End: 2025-01-10
Payer: MEDICARE

## 2025-01-10 VITALS
WEIGHT: 158 LBS | BODY MASS INDEX: 22.12 KG/M2 | OXYGEN SATURATION: 100 % | HEIGHT: 71 IN | SYSTOLIC BLOOD PRESSURE: 125 MMHG | HEART RATE: 60 BPM | DIASTOLIC BLOOD PRESSURE: 79 MMHG | RESPIRATION RATE: 16 BRPM | TEMPERATURE: 98.2 F

## 2025-01-10 DIAGNOSIS — Z86.39 PERSONAL HISTORY OF OTHER ENDOCRINE, NUTRITIONAL AND METABOLIC DISEASE: ICD-10-CM

## 2025-01-10 DIAGNOSIS — Z12.11 ENCOUNTER FOR SCREENING FOR MALIGNANT NEOPLASM OF COLON: ICD-10-CM

## 2025-01-10 DIAGNOSIS — Z95.5 PRESENCE OF CORONARY ANGIOPLASTY IMPLANT AND GRAFT: ICD-10-CM

## 2025-01-10 DIAGNOSIS — C90.00 MULTIPLE MYELOMA NOT HAVING ACHIEVED REMISSION: ICD-10-CM

## 2025-01-10 DIAGNOSIS — K63.5 POLYP OF COLON: ICD-10-CM

## 2025-01-10 DIAGNOSIS — I25.10 ATHEROSCLEROTIC HEART DISEASE OF NATIVE CORONARY ARTERY W/OUT ANGINA PECTORIS: ICD-10-CM

## 2025-01-10 PROCEDURE — 99214 OFFICE O/P EST MOD 30 MIN: CPT

## 2025-01-10 RX ORDER — METOPROLOL TARTRATE 25 MG/1
25 TABLET ORAL
Refills: 0 | Status: DISCONTINUED | COMMUNITY
End: 2025-01-10

## 2025-01-10 RX ORDER — ORAL SEMAGLUTIDE 3 MG/1
3 TABLET ORAL
Refills: 0 | Status: ACTIVE | COMMUNITY

## 2025-01-10 RX ORDER — METFORMIN HYDROCHLORIDE 625 MG/1
TABLET ORAL
Refills: 0 | Status: ACTIVE | COMMUNITY

## 2025-02-17 ENCOUNTER — APPOINTMENT (OUTPATIENT)
Dept: GASTROENTEROLOGY | Facility: AMBULATORY MEDICAL SERVICES | Age: 80
End: 2025-02-17
Payer: MEDICARE

## 2025-02-17 PROCEDURE — 45378 DIAGNOSTIC COLONOSCOPY: CPT | Mod: PT
